# Patient Record
Sex: MALE | Race: WHITE | NOT HISPANIC OR LATINO | URBAN - METROPOLITAN AREA
[De-identification: names, ages, dates, MRNs, and addresses within clinical notes are randomized per-mention and may not be internally consistent; named-entity substitution may affect disease eponyms.]

---

## 2022-04-10 ENCOUNTER — HOSPITAL ENCOUNTER (EMERGENCY)
Facility: HOSPITAL | Age: 27
Discharge: HOME | End: 2022-04-10
Attending: EMERGENCY MEDICINE

## 2022-04-10 VITALS
SYSTOLIC BLOOD PRESSURE: 137 MMHG | HEART RATE: 61 BPM | TEMPERATURE: 98 F | OXYGEN SATURATION: 99 % | BODY MASS INDEX: 29.73 KG/M2 | WEIGHT: 185 LBS | RESPIRATION RATE: 17 BRPM | DIASTOLIC BLOOD PRESSURE: 94 MMHG | HEIGHT: 66 IN

## 2022-04-10 DIAGNOSIS — R10.13 EPIGASTRIC PAIN: ICD-10-CM

## 2022-04-10 DIAGNOSIS — K29.70 GASTRITIS, PRESENCE OF BLEEDING UNSPECIFIED, UNSPECIFIED CHRONICITY, UNSPECIFIED GASTRITIS TYPE: Primary | ICD-10-CM

## 2022-04-10 LAB
ALBUMIN SERPL BCP-MCNC: 4.4 G/DL (ref 3.5–5.2)
ALP SERPL-CCNC: 107 U/L (ref 55–135)
ALT SERPL W/O P-5'-P-CCNC: 62 U/L (ref 10–44)
ANION GAP SERPL CALC-SCNC: 12 MMOL/L (ref 8–16)
AST SERPL-CCNC: 23 U/L (ref 10–40)
BASOPHILS # BLD AUTO: 0.03 K/UL (ref 0–0.2)
BASOPHILS NFR BLD: 0.2 % (ref 0–1.9)
BILIRUB SERPL-MCNC: 0.5 MG/DL (ref 0.1–1)
BUN SERPL-MCNC: 8 MG/DL (ref 6–20)
CALCIUM SERPL-MCNC: 9.8 MG/DL (ref 8.7–10.5)
CHLORIDE SERPL-SCNC: 104 MMOL/L (ref 95–110)
CO2 SERPL-SCNC: 24 MMOL/L (ref 23–29)
CREAT SERPL-MCNC: 0.8 MG/DL (ref 0.5–1.4)
DIFFERENTIAL METHOD: ABNORMAL
EOSINOPHIL # BLD AUTO: 0.1 K/UL (ref 0–0.5)
EOSINOPHIL NFR BLD: 0.7 % (ref 0–8)
ERYTHROCYTE [DISTWIDTH] IN BLOOD BY AUTOMATED COUNT: 11.8 % (ref 11.5–14.5)
EST. GFR  (AFRICAN AMERICAN): >60 ML/MIN/1.73 M^2
EST. GFR  (NON AFRICAN AMERICAN): >60 ML/MIN/1.73 M^2
GLUCOSE SERPL-MCNC: 103 MG/DL (ref 70–110)
HCT VFR BLD AUTO: 47.7 % (ref 40–54)
HGB BLD-MCNC: 15.9 G/DL (ref 14–18)
IMM GRANULOCYTES # BLD AUTO: 0.05 K/UL (ref 0–0.04)
IMM GRANULOCYTES NFR BLD AUTO: 0.4 % (ref 0–0.5)
LIPASE SERPL-CCNC: 5 U/L (ref 4–60)
LYMPHOCYTES # BLD AUTO: 1.9 K/UL (ref 1–4.8)
LYMPHOCYTES NFR BLD: 15.8 % (ref 18–48)
MAGNESIUM SERPL-MCNC: 2.2 MG/DL (ref 1.6–2.6)
MCH RBC QN AUTO: 29 PG (ref 27–31)
MCHC RBC AUTO-ENTMCNC: 33.3 G/DL (ref 32–36)
MCV RBC AUTO: 87 FL (ref 82–98)
MONOCYTES # BLD AUTO: 0.8 K/UL (ref 0.3–1)
MONOCYTES NFR BLD: 6.3 % (ref 4–15)
NEUTROPHILS # BLD AUTO: 9.4 K/UL (ref 1.8–7.7)
NEUTROPHILS NFR BLD: 76.6 % (ref 38–73)
NRBC BLD-RTO: 0 /100 WBC
PLATELET # BLD AUTO: 264 K/UL (ref 150–450)
PMV BLD AUTO: 10.7 FL (ref 9.2–12.9)
POTASSIUM SERPL-SCNC: 4.3 MMOL/L (ref 3.5–5.1)
PROT SERPL-MCNC: 7.8 G/DL (ref 6–8.4)
RBC # BLD AUTO: 5.49 M/UL (ref 4.6–6.2)
SODIUM SERPL-SCNC: 140 MMOL/L (ref 136–145)
TROPONIN I SERPL DL<=0.01 NG/ML-MCNC: <0.006 NG/ML (ref 0–0.03)
WBC # BLD AUTO: 12.27 K/UL (ref 3.9–12.7)

## 2022-04-10 PROCEDURE — 96374 THER/PROPH/DIAG INJ IV PUSH: CPT

## 2022-04-10 PROCEDURE — 83735 ASSAY OF MAGNESIUM: CPT | Performed by: PHYSICIAN ASSISTANT

## 2022-04-10 PROCEDURE — 80053 COMPREHEN METABOLIC PANEL: CPT | Performed by: PHYSICIAN ASSISTANT

## 2022-04-10 PROCEDURE — 25000003 PHARM REV CODE 250: Performed by: PHYSICIAN ASSISTANT

## 2022-04-10 PROCEDURE — 99285 EMERGENCY DEPT VISIT HI MDM: CPT | Mod: 25

## 2022-04-10 PROCEDURE — 84484 ASSAY OF TROPONIN QUANT: CPT | Performed by: PHYSICIAN ASSISTANT

## 2022-04-10 PROCEDURE — 83690 ASSAY OF LIPASE: CPT | Performed by: PHYSICIAN ASSISTANT

## 2022-04-10 PROCEDURE — 93005 ELECTROCARDIOGRAM TRACING: CPT

## 2022-04-10 PROCEDURE — 85025 COMPLETE CBC W/AUTO DIFF WBC: CPT | Performed by: PHYSICIAN ASSISTANT

## 2022-04-10 PROCEDURE — 93010 EKG 12-LEAD: ICD-10-PCS | Mod: ,,, | Performed by: INTERNAL MEDICINE

## 2022-04-10 PROCEDURE — 93010 ELECTROCARDIOGRAM REPORT: CPT | Mod: ,,, | Performed by: INTERNAL MEDICINE

## 2022-04-10 RX ORDER — ONDANSETRON 4 MG/1
4 TABLET, ORALLY DISINTEGRATING ORAL
Status: COMPLETED | OUTPATIENT
Start: 2022-04-10 | End: 2022-04-10

## 2022-04-10 RX ORDER — MAG HYDROX/ALUMINUM HYD/SIMETH 200-200-20
30 SUSPENSION, ORAL (FINAL DOSE FORM) ORAL ONCE
Status: COMPLETED | OUTPATIENT
Start: 2022-04-10 | End: 2022-04-10

## 2022-04-10 RX ORDER — FAMOTIDINE 20 MG/1
40 TABLET, FILM COATED ORAL 2 TIMES DAILY
Qty: 120 TABLET | Refills: 0 | Status: SHIPPED | OUTPATIENT
Start: 2022-04-10 | End: 2023-04-10

## 2022-04-10 RX ORDER — LIDOCAINE HYDROCHLORIDE 20 MG/ML
10 SOLUTION OROPHARYNGEAL ONCE
Status: COMPLETED | OUTPATIENT
Start: 2022-04-10 | End: 2022-04-10

## 2022-04-10 RX ORDER — FAMOTIDINE 10 MG/ML
20 INJECTION INTRAVENOUS
Status: COMPLETED | OUTPATIENT
Start: 2022-04-10 | End: 2022-04-10

## 2022-04-10 RX ORDER — SUCRALFATE 1 G/1
1 TABLET ORAL 4 TIMES DAILY
Qty: 56 TABLET | Refills: 0 | Status: SHIPPED | OUTPATIENT
Start: 2022-04-10 | End: 2022-04-24

## 2022-04-10 RX ORDER — ACETAMINOPHEN 325 MG/1
650 TABLET ORAL
Status: COMPLETED | OUTPATIENT
Start: 2022-04-10 | End: 2022-04-10

## 2022-04-10 RX ADMIN — MAGNESIUM HYDROXIDE/ALUMINUM HYDROXICE/SIMETHICONE 30 ML: 120; 1200; 1200 SUSPENSION ORAL at 04:04

## 2022-04-10 RX ADMIN — ACETAMINOPHEN 650 MG: 325 TABLET ORAL at 04:04

## 2022-04-10 RX ADMIN — ONDANSETRON 4 MG: 4 TABLET, ORALLY DISINTEGRATING ORAL at 04:04

## 2022-04-10 RX ADMIN — LIDOCAINE HYDROCHLORIDE 10 ML: 20 SOLUTION ORAL at 04:04

## 2022-04-10 RX ADMIN — FAMOTIDINE 20 MG: 10 INJECTION INTRAVENOUS at 05:04

## 2022-04-10 NOTE — ED PROVIDER NOTES
Encounter Date: 4/10/2022       History     Chief Complaint   Patient presents with    Abdominal Pain     Accompanied by cousin reports epigastric pain described as a burning sensation accompanied by vomiting and a headache     25yo M with chief complaint approx 1mo hx epigastric pain with associated nausea, dizziness.    Pt states pain sharp, stabbing, intermittent, nonradiating, typically to epigastric region. No hx GERD, no hx gallbladder issues, no hx pancreatitis. No abdominal surgeries. Normal BMs. No melena or hematochezia. Pain typically follows meals. Pain sometimes lasts majority of day. Sometimes pain worsened with recumbency. States sometimes feels dizzy during pain. Also with frequent belching. Nausea without emesis. No cough. No SOB or CP. No pleuritic pain. No fever. No recent illness. No trauma. No relief with tylenol or ibuprofen.    Pt denies significant cardiac hx. Denies hx HTN, HLD, DM, or family hx cardiac dz.         Review of patient's allergies indicates:  No Known Allergies  No past medical history on file.  No past surgical history on file.  No family history on file.     Review of Systems   Constitutional: Negative for chills and fever.   Respiratory: Negative for shortness of breath.    Cardiovascular: Negative for chest pain.   Gastrointestinal: Positive for abdominal pain and nausea. Negative for blood in stool, constipation, diarrhea and vomiting.   Musculoskeletal: Negative for back pain.   Neurological: Positive for dizziness. Negative for syncope.       Physical Exam     Initial Vitals [04/10/22 0155]   BP Pulse Resp Temp SpO2   139/87 81 17 97.6 °F (36.4 °C) 99 %      MAP       --         Physical Exam    Nursing note and vitals reviewed.  Constitutional: He appears well-developed and well-nourished. He is not diaphoretic. No distress.   HENT:   Head: Normocephalic and atraumatic.   Eyes: EOM are normal.   Neck: Neck supple.   Normal range of motion.  Cardiovascular: Intact  distal pulses.   NSR   Pulmonary/Chest: Breath sounds normal. No respiratory distress. He has no wheezes. He exhibits no tenderness.   Abdominal: Abdomen is soft. Bowel sounds are normal. He exhibits no distension.   Epigastric, LUQ ttp. Negative vines's sign.    Musculoskeletal:         General: No tenderness. Normal range of motion.      Cervical back: Normal range of motion and neck supple.     Neurological: He is alert and oriented to person, place, and time. GCS score is 15. GCS eye subscore is 4. GCS verbal subscore is 5. GCS motor subscore is 6.   Skin: Skin is warm. Capillary refill takes less than 2 seconds.   Psychiatric: He has a normal mood and affect. Thought content normal.         ED Course   Procedures  Labs Reviewed   CBC W/ AUTO DIFFERENTIAL - Abnormal; Notable for the following components:       Result Value    Gran # (ANC) 9.4 (*)     Immature Grans (Abs) 0.05 (*)     Gran % 76.6 (*)     Lymph % 15.8 (*)     All other components within normal limits   COMPREHENSIVE METABOLIC PANEL - Abnormal; Notable for the following components:    ALT 62 (*)     All other components within normal limits   LIPASE   TROPONIN I   MAGNESIUM     EKG Readings: (Independently Interpreted)   Normal sinus rhythm, ventricular rate 73 beats per minute.  Normal PA, normal QT.  there is right axis deviation.  No ST elevation.  Flattening versus inversion V1.  No previous for comparison.       Imaging Results          US Abdomen Limited (Final result)  Result time 04/10/22 06:26:11    Final result by Arnav Weaver DO (04/10/22 06:26:11)                 Impression:      No cholelithiasis or acute cholecystitis.    Hepatic steatosis.      Electronically signed by: Arnav Weaver  Date:    04/10/2022  Time:    06:26             Narrative:    EXAMINATION:  US ABDOMEN LIMITED    CLINICAL HISTORY:  epigastric pain;    TECHNIQUE:  Limited ultrasound of the right upper quadrant of the abdomen (including pancreas, liver,  gallbladder, common bile duct, and right kidney) was performed.    COMPARISON:  None.    FINDINGS:  Liver: Increased echogenicity of the hepatic parenchyma compatible with hepatic steatosis.  There are foci of fat sparing adjacent to the gallbladder.    Gallbladder: No calculi, wall thickening, or pericholecystic fluid.  No sonographic Websetr's sign.    Biliary system: The common duct is not dilated, measuring 4 mm.  No intrahepatic ductal dilatation.    Pancreas: Unremarkable.    Right kidney: No hydronephrosis.    Miscellaneous: No upper abdominal ascites.                               X-Ray Chest 1 View (Final result)  Result time 04/10/22 05:46:56    Final result by Thom Coombs MD (04/10/22 05:46:56)                 Impression:      Hypoventilatory examination with resultant bronchovascular crowding, noting somewhat asymmetric subsegmental opacities at the right lung base.  While this may relate to atelectasis, early infectious process or aspiration not excluded.  Clinical correlation is advised..      Electronically signed by: Thom Coombs MD  Date:    04/10/2022  Time:    05:46             Narrative:    EXAMINATION:  XR CHEST 1 VIEW    CLINICAL HISTORY:  Epigastric pain    TECHNIQUE:  Single frontal view of the chest was performed.    COMPARISON:  None    FINDINGS:  Cardiac silhouette appears within normal limits.  Lung volumes are mildly diminished with resultant bronchovascular crowding.  There are mild asymmetric subsegmental opacities at the right lung base.  The remaining lungs appear relatively well aerated without large confluent airspace consolidation.  No significant volume of pleural fluid or pneumothorax identified.  Osseous structures appear intact.                                 Medications   aluminum-magnesium hydroxide-simethicone 200-200-20 mg/5 mL suspension 30 mL (30 mLs Oral Given 4/10/22 0409)     And   LIDOcaine HCl 2% oral solution 10 mL (10 mLs Oral Given 4/10/22 0409)    ondansetron disintegrating tablet 4 mg (4 mg Oral Given 4/10/22 4489)   acetaminophen tablet 650 mg (650 mg Oral Given 4/10/22 0429)   famotidine (PF) injection 20 mg (20 mg Intravenous Given 4/10/22 0430)     Medical Decision Making:   Differential Diagnosis:   GERD, cholecystitis, ACS, pancreatitis, SBO, constipation, enteritis, colitis  Clinical Tests:   Lab Tests: Ordered  Radiological Study: Ordered  Medical Tests: Ordered and Reviewed      Assumed care of patient at turnover, noted mild epigastric tenderness relieved with GI cocktail, negative workup otherwise, ultrasound negative. At this time given patient's history, physical exam, and ED workup do not suspect pancreatitis, testicular torsion, cholecystitis, appendicitis, SBO, diverticulitis, MI/ACS, acute hepatitis, pyelonephritis, ureterolithiasis, or any further malignant cause.  Will treat symptomatically from this point forward.  Discussed diagnosis and further treatment with patient, including f/u in clinic as needed.  Return precautions given and all questions answered.  Patient in understanding of plan.  Pt discharged to home improved and stable.                            Clinical Impression:   Final diagnoses:  [R10.13] Epigastric pain  [K29.70] Gastritis, presence of bleeding unspecified, unspecified chronicity, unspecified gastritis type (Primary)          ED Disposition Condition    Discharge Stable        ED Prescriptions     Medication Sig Dispense Start Date End Date Auth. Provider    famotidine (PEPCID) 20 MG tablet Take 2 tablets (40 mg total) by mouth 2 (two) times daily. 120 tablet 4/10/2022 4/10/2023 Kvng Hawk MD    sucralfate (CARAFATE) 1 gram tablet Take 1 tablet (1 g total) by mouth 4 (four) times daily. for 14 days 56 tablet 4/10/2022 4/24/2022 Kvng Hawk MD        Follow-up Information     Follow up With Specialties Details Why Contact Info    [facility] Emergency Dept Emergency Medicine Go to  If symptoms  [address] [place] [facility] [id number]  [phone]           Kvng Hawk MD  04/10/22 0666